# Patient Record
Sex: MALE | Race: OTHER | HISPANIC OR LATINO | URBAN - METROPOLITAN AREA
[De-identification: names, ages, dates, MRNs, and addresses within clinical notes are randomized per-mention and may not be internally consistent; named-entity substitution may affect disease eponyms.]

---

## 2018-09-01 ENCOUNTER — EMERGENCY (EMERGENCY)
Facility: HOSPITAL | Age: 83
LOS: 1 days | Discharge: HOME | End: 2018-09-01
Attending: EMERGENCY MEDICINE | Admitting: EMERGENCY MEDICINE
Payer: MEDICAID

## 2018-09-01 VITALS
OXYGEN SATURATION: 98 % | DIASTOLIC BLOOD PRESSURE: 77 MMHG | TEMPERATURE: 99 F | SYSTOLIC BLOOD PRESSURE: 132 MMHG | HEART RATE: 97 BPM | RESPIRATION RATE: 16 BRPM

## 2018-09-01 DIAGNOSIS — R41.82 ALTERED MENTAL STATUS, UNSPECIFIED: ICD-10-CM
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
## 2018-09-01 LAB
ALBUMIN SERPL ELPH-MCNC: 4.3 G/DL — SIGNIFICANT CHANGE UP (ref 3.3–5)
ALP SERPL-CCNC: 71 U/L — SIGNIFICANT CHANGE UP (ref 40–120)
ALT FLD-CCNC: 10 U/L — SIGNIFICANT CHANGE UP (ref 10–45)
ANION GAP SERPL CALC-SCNC: 12 MMOL/L — SIGNIFICANT CHANGE UP (ref 5–17)
ANION GAP SERPL CALC-SCNC: 14 MMOL/L — SIGNIFICANT CHANGE UP (ref 5–17)
APPEARANCE UR: CLEAR — SIGNIFICANT CHANGE UP
AST SERPL-CCNC: 26 U/L — SIGNIFICANT CHANGE UP (ref 10–40)
BASOPHILS NFR BLD AUTO: 0.5 % — SIGNIFICANT CHANGE UP (ref 0–2)
BILIRUB SERPL-MCNC: 0.4 MG/DL — SIGNIFICANT CHANGE UP (ref 0.2–1.2)
BILIRUB UR-MCNC: NEGATIVE — SIGNIFICANT CHANGE UP
BUN SERPL-MCNC: 20 MG/DL — SIGNIFICANT CHANGE UP (ref 7–23)
BUN SERPL-MCNC: 21 MG/DL — SIGNIFICANT CHANGE UP (ref 7–23)
CALCIUM SERPL-MCNC: 8.2 MG/DL — LOW (ref 8.4–10.5)
CALCIUM SERPL-MCNC: 9.4 MG/DL — SIGNIFICANT CHANGE UP (ref 8.4–10.5)
CHLORIDE SERPL-SCNC: 97 MMOL/L — SIGNIFICANT CHANGE UP (ref 96–108)
CHLORIDE SERPL-SCNC: 99 MMOL/L — SIGNIFICANT CHANGE UP (ref 96–108)
CK SERPL-CCNC: 761 U/L — HIGH (ref 30–200)
CO2 SERPL-SCNC: 27 MMOL/L — SIGNIFICANT CHANGE UP (ref 22–31)
CO2 SERPL-SCNC: 28 MMOL/L — SIGNIFICANT CHANGE UP (ref 22–31)
COLOR SPEC: YELLOW — SIGNIFICANT CHANGE UP
CREAT SERPL-MCNC: 1.63 MG/DL — HIGH (ref 0.5–1.3)
CREAT SERPL-MCNC: 1.81 MG/DL — HIGH (ref 0.5–1.3)
DIFF PNL FLD: ABNORMAL
EOSINOPHIL NFR BLD AUTO: 5.7 % — SIGNIFICANT CHANGE UP (ref 0–6)
ETHANOL SERPL-MCNC: <10 MG/DL — SIGNIFICANT CHANGE UP (ref 0–10)
GLUCOSE SERPL-MCNC: 180 MG/DL — HIGH (ref 70–99)
GLUCOSE SERPL-MCNC: 95 MG/DL — SIGNIFICANT CHANGE UP (ref 70–99)
GLUCOSE UR QL: NEGATIVE — SIGNIFICANT CHANGE UP
HCT VFR BLD CALC: 42 % — SIGNIFICANT CHANGE UP (ref 39–50)
HGB BLD-MCNC: 13.4 G/DL — SIGNIFICANT CHANGE UP (ref 13–17)
KETONES UR-MCNC: NEGATIVE — SIGNIFICANT CHANGE UP
LEUKOCYTE ESTERASE UR-ACNC: NEGATIVE — SIGNIFICANT CHANGE UP
LYMPHOCYTES # BLD AUTO: 16.8 % — SIGNIFICANT CHANGE UP (ref 13–44)
MCHC RBC-ENTMCNC: 27.9 PG — SIGNIFICANT CHANGE UP (ref 27–34)
MCHC RBC-ENTMCNC: 31.9 G/DL — LOW (ref 32–36)
MCV RBC AUTO: 87.5 FL — SIGNIFICANT CHANGE UP (ref 80–100)
MONOCYTES NFR BLD AUTO: 12.3 % — SIGNIFICANT CHANGE UP (ref 2–14)
NEUTROPHILS NFR BLD AUTO: 64.7 % — SIGNIFICANT CHANGE UP (ref 43–77)
NITRITE UR-MCNC: NEGATIVE — SIGNIFICANT CHANGE UP
PCP SPEC-MCNC: SIGNIFICANT CHANGE UP
PH UR: 5.5 — SIGNIFICANT CHANGE UP (ref 5–8)
PLATELET # BLD AUTO: 203 K/UL — SIGNIFICANT CHANGE UP (ref 150–400)
POTASSIUM SERPL-MCNC: 4.1 MMOL/L — SIGNIFICANT CHANGE UP (ref 3.5–5.3)
POTASSIUM SERPL-MCNC: 4.2 MMOL/L — SIGNIFICANT CHANGE UP (ref 3.5–5.3)
POTASSIUM SERPL-SCNC: 4.1 MMOL/L — SIGNIFICANT CHANGE UP (ref 3.5–5.3)
POTASSIUM SERPL-SCNC: 4.2 MMOL/L — SIGNIFICANT CHANGE UP (ref 3.5–5.3)
PROT SERPL-MCNC: 7.8 G/DL — SIGNIFICANT CHANGE UP (ref 6–8.3)
PROT UR-MCNC: ABNORMAL MG/DL
RBC # BLD: 4.8 M/UL — SIGNIFICANT CHANGE UP (ref 4.2–5.8)
RBC # FLD: 15.9 % — SIGNIFICANT CHANGE UP (ref 10.3–16.9)
SODIUM SERPL-SCNC: 138 MMOL/L — SIGNIFICANT CHANGE UP (ref 135–145)
SODIUM SERPL-SCNC: 139 MMOL/L — SIGNIFICANT CHANGE UP (ref 135–145)
SP GR SPEC: 1.02 — SIGNIFICANT CHANGE UP (ref 1–1.03)
UROBILINOGEN FLD QL: 0.2 E.U./DL — SIGNIFICANT CHANGE UP
WBC # BLD: 7.8 K/UL — SIGNIFICANT CHANGE UP (ref 3.8–10.5)
WBC # FLD AUTO: 7.8 K/UL — SIGNIFICANT CHANGE UP (ref 3.8–10.5)

## 2018-09-01 PROCEDURE — 80048 BASIC METABOLIC PNL TOTAL CA: CPT

## 2018-09-01 PROCEDURE — 70450 CT HEAD/BRAIN W/O DYE: CPT

## 2018-09-01 PROCEDURE — 84484 ASSAY OF TROPONIN QUANT: CPT

## 2018-09-01 PROCEDURE — 96360 HYDRATION IV INFUSION INIT: CPT

## 2018-09-01 PROCEDURE — 70450 CT HEAD/BRAIN W/O DYE: CPT | Mod: 26

## 2018-09-01 PROCEDURE — 99284 EMERGENCY DEPT VISIT MOD MDM: CPT

## 2018-09-01 PROCEDURE — 80053 COMPREHEN METABOLIC PANEL: CPT

## 2018-09-01 PROCEDURE — 36415 COLL VENOUS BLD VENIPUNCTURE: CPT

## 2018-09-01 PROCEDURE — 85025 COMPLETE CBC W/AUTO DIFF WBC: CPT

## 2018-09-01 PROCEDURE — 83880 ASSAY OF NATRIURETIC PEPTIDE: CPT

## 2018-09-01 PROCEDURE — 82962 GLUCOSE BLOOD TEST: CPT

## 2018-09-01 PROCEDURE — 99284 EMERGENCY DEPT VISIT MOD MDM: CPT | Mod: 25

## 2018-09-01 PROCEDURE — 82550 ASSAY OF CK (CPK): CPT

## 2018-09-01 PROCEDURE — 80307 DRUG TEST PRSMV CHEM ANLYZR: CPT

## 2018-09-01 PROCEDURE — 81001 URINALYSIS AUTO W/SCOPE: CPT

## 2018-09-01 PROCEDURE — 87086 URINE CULTURE/COLONY COUNT: CPT

## 2018-09-01 RX ORDER — SODIUM CHLORIDE 9 MG/ML
1000 INJECTION INTRAMUSCULAR; INTRAVENOUS; SUBCUTANEOUS ONCE
Qty: 0 | Refills: 0 | Status: COMPLETED | OUTPATIENT
Start: 2018-09-01 | End: 2018-09-01

## 2018-09-01 RX ORDER — SODIUM CHLORIDE 9 MG/ML
500 INJECTION INTRAMUSCULAR; INTRAVENOUS; SUBCUTANEOUS ONCE
Qty: 0 | Refills: 0 | Status: COMPLETED | OUTPATIENT
Start: 2018-09-01 | End: 2018-09-01

## 2018-09-01 RX ADMIN — SODIUM CHLORIDE 1000 MILLILITER(S): 9 INJECTION INTRAMUSCULAR; INTRAVENOUS; SUBCUTANEOUS at 20:24

## 2018-09-01 RX ADMIN — SODIUM CHLORIDE 1000 MILLILITER(S): 9 INJECTION INTRAMUSCULAR; INTRAVENOUS; SUBCUTANEOUS at 21:57

## 2018-09-01 RX ADMIN — SODIUM CHLORIDE 1000 MILLILITER(S): 9 INJECTION INTRAMUSCULAR; INTRAVENOUS; SUBCUTANEOUS at 19:28

## 2018-09-01 NOTE — ED ADULT NURSE REASSESSMENT NOTE - NS ED NURSE REASSESS COMMENT FT1
Patient a/ox 3, vital signs stable, additional labs sent, results and disposition pending.  NSS 500ml bolus additional completed.

## 2018-09-01 NOTE — ED ADULT NURSE NOTE - LANGUAGE ASSISTANCE NEEDED
Yes-Patient/Caregiver accepts free interpretation services.../I am fluent in Syriac, able to interpret for pt in triage

## 2018-09-01 NOTE — ED ADULT NURSE NOTE - NSIMPLEMENTINTERV_GEN_ALL_ED
Implemented All Fall Risk Interventions:  Blue to call system. Call bell, personal items and telephone within reach. Instruct patient to call for assistance. Room bathroom lighting operational. Non-slip footwear when patient is off stretcher. Physically safe environment: no spills, clutter or unnecessary equipment. Stretcher in lowest position, wheels locked, appropriate side rails in place. Provide visual cue, wrist band, yellow gown, etc. Monitor gait and stability. Monitor for mental status changes and reorient to person, place, and time. Review medications for side effects contributing to fall risk. Reinforce activity limits and safety measures with patient and family.

## 2018-09-01 NOTE — ED ADULT NURSE REASSESSMENT NOTE - NS ED NURSE REASSESS COMMENT FT1
Patient a/oX 3, no pain or other symptom complaint, cooperative with care, vital signs stable.  Left FA PIV #20 in place, all labs sent, no complications.  NSS bolus ongoing, tolerating well.  Fall precaution observed.

## 2018-09-01 NOTE — ED ADULT TRIAGE NOTE - CHIEF COMPLAINT QUOTE
Pt BIBA from street, as per EMS pt had pinpoint pupils and not responding verbally, given Narcan 0.2mg IM and pt became alert and oriented. Pt denies any pain, injury, complaints.

## 2018-09-01 NOTE — ED PROVIDER NOTE - PHYSICAL EXAMINATION
CON: somnolent, arousable to loud verbal or painful stimuli, HENMT: no pooling of secretion, protecting airway, no facial ecchymosis or evidence of trauma, HEAD: no obvious hematoma or laceration, CV: rrr, RESP: chest rise, breathing spontaneously, GI: soft, SKIN: bl LE w/ ace wrap and kerlex, after undressing, no obvious bleeding, noted skin discoloration c/w chronic venous insufficiency, and chronic ulcer w/o drainage, MSK: no deformity noted, NEURO: limited exam 2/2 mental status

## 2018-09-01 NOTE — ED ADULT NURSE NOTE - OBJECTIVE STATEMENT
Patient BIBA due to pinpoint pupils and passed out on street, arrives a/ox 3, s/p Narcan IM administered by EMS.  Arrives a/oX 3, disoriented to situation, states does not know what happened, states just passed out, not in any pain, SOB or distress, answers other questions appropriately, POC glucose 97 in ED, c/o of head pain/headache.  No obvious injuries, open areas or bleeding noted, no other neuro deficits.

## 2018-09-01 NOTE — ED ADULT NURSE REASSESSMENT NOTE - NS ED NURSE REASSESS COMMENT FT1
Patient sitting up in bed at th is time , a/oX 3, no c/o of any pain or SOB, no distress.  NSS bolus completed.  Left FA PIV #20 in place, all labs sent.  Food and fluids PO tolerated well.  Disposition pending.

## 2018-09-01 NOTE — ED ADULT TRIAGE NOTE - LANGUAGE ASSISTANCE NEEDED
I am fluent in Argentine, able to interpret for pt in triage/Yes-Patient/Caregiver accepts free interpretation services...

## 2018-09-01 NOTE — ED PROVIDER NOTE - OBJECTIVE STATEMENT
84 yom pw ams, found by bystanders sitting on a bench, slumped over.  also c/o headache in the back.  per EMS, pinpoint pupils, rec'd IM narcan, w/ improved MS.  pt states he takes a multitude of meds.  found morphine/ibuprofen in pocket.  hx of chronic venous insufficiency.  denies bleeding.

## 2018-09-02 VITALS
HEART RATE: 82 BPM | DIASTOLIC BLOOD PRESSURE: 71 MMHG | SYSTOLIC BLOOD PRESSURE: 108 MMHG | RESPIRATION RATE: 18 BRPM | OXYGEN SATURATION: 99 %

## 2018-09-03 LAB
CULTURE RESULTS: SIGNIFICANT CHANGE UP
SPECIMEN SOURCE: SIGNIFICANT CHANGE UP

## 2018-09-06 LAB — DRUG SCREEN, SERUM: ABNORMAL

## 2018-09-23 ENCOUNTER — INPATIENT (INPATIENT)
Facility: HOSPITAL | Age: 83
LOS: 0 days | Discharge: HOME | DRG: 191 | End: 2018-09-24
Attending: STUDENT IN AN ORGANIZED HEALTH CARE EDUCATION/TRAINING PROGRAM | Admitting: STUDENT IN AN ORGANIZED HEALTH CARE EDUCATION/TRAINING PROGRAM
Payer: MEDICAID

## 2018-09-23 VITALS
HEART RATE: 109 BPM | SYSTOLIC BLOOD PRESSURE: 116 MMHG | RESPIRATION RATE: 22 BRPM | DIASTOLIC BLOOD PRESSURE: 71 MMHG | TEMPERATURE: 99 F | OXYGEN SATURATION: 92 %

## 2018-09-23 LAB
ALBUMIN SERPL ELPH-MCNC: 4 G/DL — SIGNIFICANT CHANGE UP (ref 3.3–5)
ALP SERPL-CCNC: 64 U/L — SIGNIFICANT CHANGE UP (ref 40–120)
ALT FLD-CCNC: <5 U/L — LOW (ref 10–45)
ANION GAP SERPL CALC-SCNC: 12 MMOL/L — SIGNIFICANT CHANGE UP (ref 5–17)
AST SERPL-CCNC: 10 U/L — SIGNIFICANT CHANGE UP (ref 10–40)
BASOPHILS NFR BLD AUTO: 0.3 % — SIGNIFICANT CHANGE UP (ref 0–2)
BILIRUB SERPL-MCNC: 0.2 MG/DL — SIGNIFICANT CHANGE UP (ref 0.2–1.2)
BUN SERPL-MCNC: 11 MG/DL — SIGNIFICANT CHANGE UP (ref 7–23)
CALCIUM SERPL-MCNC: 9 MG/DL — SIGNIFICANT CHANGE UP (ref 8.4–10.5)
CHLORIDE SERPL-SCNC: 97 MMOL/L — SIGNIFICANT CHANGE UP (ref 96–108)
CO2 SERPL-SCNC: 31 MMOL/L — SIGNIFICANT CHANGE UP (ref 22–31)
CREAT SERPL-MCNC: 1.19 MG/DL — SIGNIFICANT CHANGE UP (ref 0.5–1.3)
EOSINOPHIL NFR BLD AUTO: 4.9 % — SIGNIFICANT CHANGE UP (ref 0–6)
GLUCOSE SERPL-MCNC: 94 MG/DL — SIGNIFICANT CHANGE UP (ref 70–99)
HCT VFR BLD CALC: 42 % — SIGNIFICANT CHANGE UP (ref 39–50)
HGB BLD-MCNC: 13.4 G/DL — SIGNIFICANT CHANGE UP (ref 13–17)
LYMPHOCYTES # BLD AUTO: 17.6 % — SIGNIFICANT CHANGE UP (ref 13–44)
MCHC RBC-ENTMCNC: 28.2 PG — SIGNIFICANT CHANGE UP (ref 27–34)
MCHC RBC-ENTMCNC: 31.9 G/DL — LOW (ref 32–36)
MCV RBC AUTO: 88.4 FL — SIGNIFICANT CHANGE UP (ref 80–100)
MONOCYTES NFR BLD AUTO: 7.6 % — SIGNIFICANT CHANGE UP (ref 2–14)
NEUTROPHILS NFR BLD AUTO: 69.6 % — SIGNIFICANT CHANGE UP (ref 43–77)
PLATELET # BLD AUTO: 320 K/UL — SIGNIFICANT CHANGE UP (ref 150–400)
POTASSIUM SERPL-MCNC: 4.2 MMOL/L — SIGNIFICANT CHANGE UP (ref 3.5–5.3)
POTASSIUM SERPL-SCNC: 4.2 MMOL/L — SIGNIFICANT CHANGE UP (ref 3.5–5.3)
PROT SERPL-MCNC: 7.8 G/DL — SIGNIFICANT CHANGE UP (ref 6–8.3)
RBC # BLD: 4.75 M/UL — SIGNIFICANT CHANGE UP (ref 4.2–5.8)
RBC # FLD: 15.1 % — SIGNIFICANT CHANGE UP (ref 10.3–16.9)
SODIUM SERPL-SCNC: 140 MMOL/L — SIGNIFICANT CHANGE UP (ref 135–145)
WBC # BLD: 9.2 K/UL — SIGNIFICANT CHANGE UP (ref 3.8–10.5)
WBC # FLD AUTO: 9.2 K/UL — SIGNIFICANT CHANGE UP (ref 3.8–10.5)

## 2018-09-23 PROCEDURE — 99223 1ST HOSP IP/OBS HIGH 75: CPT | Mod: GC

## 2018-09-23 PROCEDURE — 71250 CT THORAX DX C-: CPT | Mod: 26

## 2018-09-23 PROCEDURE — 99285 EMERGENCY DEPT VISIT HI MDM: CPT | Mod: 25

## 2018-09-23 PROCEDURE — 71045 X-RAY EXAM CHEST 1 VIEW: CPT | Mod: 26

## 2018-09-23 PROCEDURE — 70450 CT HEAD/BRAIN W/O DYE: CPT | Mod: 26

## 2018-09-23 RX ORDER — IPRATROPIUM/ALBUTEROL SULFATE 18-103MCG
3 AEROSOL WITH ADAPTER (GRAM) INHALATION ONCE
Qty: 0 | Refills: 0 | Status: COMPLETED | OUTPATIENT
Start: 2018-09-23 | End: 2018-09-23

## 2018-09-23 RX ORDER — AZITHROMYCIN 500 MG/1
400 TABLET, FILM COATED ORAL ONCE
Qty: 0 | Refills: 0 | Status: DISCONTINUED | OUTPATIENT
Start: 2018-09-23 | End: 2018-09-23

## 2018-09-23 RX ORDER — CEFTRIAXONE 500 MG/1
1 INJECTION, POWDER, FOR SOLUTION INTRAMUSCULAR; INTRAVENOUS ONCE
Qty: 0 | Refills: 0 | Status: COMPLETED | OUTPATIENT
Start: 2018-09-23 | End: 2018-09-23

## 2018-09-23 RX ORDER — SODIUM CHLORIDE 9 MG/ML
1000 INJECTION INTRAMUSCULAR; INTRAVENOUS; SUBCUTANEOUS ONCE
Qty: 0 | Refills: 0 | Status: COMPLETED | OUTPATIENT
Start: 2018-09-23 | End: 2018-09-23

## 2018-09-23 RX ORDER — AZITHROMYCIN 500 MG/1
500 TABLET, FILM COATED ORAL ONCE
Qty: 0 | Refills: 0 | Status: COMPLETED | OUTPATIENT
Start: 2018-09-23 | End: 2018-09-23

## 2018-09-23 RX ADMIN — Medication 3 MILLILITER(S): at 19:55

## 2018-09-23 RX ADMIN — Medication 3 MILLILITER(S): at 23:27

## 2018-09-23 RX ADMIN — CEFTRIAXONE 100 GRAM(S): 500 INJECTION, POWDER, FOR SOLUTION INTRAMUSCULAR; INTRAVENOUS at 22:05

## 2018-09-23 RX ADMIN — AZITHROMYCIN 255 MILLIGRAM(S): 500 TABLET, FILM COATED ORAL at 23:02

## 2018-09-23 RX ADMIN — SODIUM CHLORIDE 1000 MILLILITER(S): 9 INJECTION INTRAMUSCULAR; INTRAVENOUS; SUBCUTANEOUS at 21:00

## 2018-09-23 RX ADMIN — Medication 3 MILLILITER(S): at 19:59

## 2018-09-23 RX ADMIN — SODIUM CHLORIDE 2000 MILLILITER(S): 9 INJECTION INTRAMUSCULAR; INTRAVENOUS; SUBCUTANEOUS at 19:59

## 2018-09-23 RX ADMIN — CEFTRIAXONE 1 GRAM(S): 500 INJECTION, POWDER, FOR SOLUTION INTRAMUSCULAR; INTRAVENOUS at 22:35

## 2018-09-23 NOTE — H&P ADULT - PROBLEM SELECTOR PLAN 3
Patient with history of diabetes on metformin, ISS while inpatient  A1C in the AM Patient presenting with hypoxia and tachycardia w/ known active lung malignancy  Given high risk for PE will do CT PE to r/o embolism  c/w lovenox for DVT prophylaxis, pending results will escalate to therapeutic dose

## 2018-09-23 NOTE — ED ADULT TRIAGE NOTE - CHIEF COMPLAINT QUOTE
EMS states "he was with his friends and they called 911 because they thought that he was weak and a little confused."

## 2018-09-23 NOTE — ED PROVIDER NOTE - PHYSICAL EXAMINATION
CONSTITUTIONAL: elderly male, lethargic, NAD  SKIN: Senile skin turgor, normal color.  Lips dry. No rash.    HEAD: NC/AT.  EYES: Conjunctiva clear. EOMI. PERRL.    ENT: Airway patent, OP without erythema, tonsillar swelling or exudate; uvula midline without swelling. Nasal mucosa clear, no rhinorrhea.   RESPIRATORY:  Breathing non-labored. No retractions or accessory muscle use.  Lungs with exp crackles and wheezes bilaterally. O2 sat 92% room air, 100% on supplemental oxygen.  CARDIOVASCULAR:  RRR, S1S2. No M/R/G.      GI:  Abdomen soft, nontender.    MSK: Neck supple with painless ROM.  Chronic venous stasis changes bilaterally, no LE edema.  NEURO: lethargic, but able to follow commands.  CN grossly intact. KAPOOR 5/5 strength, no arm drift.  F-N intact.

## 2018-09-23 NOTE — H&P ADULT - ASSESSMENT
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

## 2018-09-23 NOTE — H&P ADULT - PROBLEM SELECTOR PLAN 4
Patient with active lung CA not pursuing treatment get collateral from PCP in the AM Patient with history of diabetes on metformin, ISS while inpatient  A1C in the AM monitor for withdrawl sxs, supportive care for sxs

## 2018-09-23 NOTE — H&P ADULT - NSHPLABSRESULTS_GEN_ALL_CORE
LABS:                         13.4   9.2   )-----------( 320      ( 23 Sep 2018 19:48 )             42.0     09-23    140  |  97  |  11  ----------------------------<  94  4.2   |  31  |  1.19    Ca    9.0      23 Sep 2018 19:48    TPro  7.8  /  Alb  4.0  /  TBili  0.2  /  DBili  x   /  AST  10  /  ALT  <5<L>  /  AlkPhos  64  09-23                  RADIOLOGY, EKG & ADDITIONAL TESTS: Reviewed.

## 2018-09-23 NOTE — ED ADULT NURSE NOTE - NSIMPLEMENTINTERV_GEN_ALL_ED
Implemented All Fall with Harm Risk Interventions:  [place] to call system. Call bell, personal items and telephone within reach. Instruct patient to call for assistance. Room bathroom lighting operational. Non-slip footwear when patient is off stretcher. Physically safe environment: no spills, clutter or unnecessary equipment. Stretcher in lowest position, wheels locked, appropriate side rails in place. Provide visual cue, wrist band, yellow gown, etc. Monitor gait and stability. Monitor for mental status changes and reorient to person, place, and time. Review medications for side effects contributing to fall risk. Reinforce activity limits and safety measures with patient and family. Provide visual clues: red socks.

## 2018-09-23 NOTE — H&P ADULT - ATTENDING COMMENTS
patient seen and examined    reviewed vs, labs, available radiological reports/ studies, ekg     agree w/ PE findings as above w/ additions/ exceptions/ pertinent findings:     1.  2.   3.       rest of plan as above patient seen and examined    reviewed vs, labs, available radiological reports/ studies, ekg     agree w/ PE findings as above w/ additions/ exceptions/ pertinent findings: asleep, awoke, appears tired, NAD, tongue is dry, +JVD, s1s2, lungs mostly clear w/ fine crackles at bases b/l, nt/nd, +venous stasis at lower ext, wrapped in ace bandages b/l     1. COPD exacerbation/ rhinovirus: duonebs, prednisone PO x 4 days, on supplemental o2 ; c/w azithromycin for COPD exacerbation; check BNP  2. follow up CT angio chest  3. monitor and treat heroin wthdrawl sxs      rest of plan as above

## 2018-09-23 NOTE — H&P ADULT - PROBLEM SELECTOR PLAN 7
diabetic diet  K>4 Mg>2 lovenox in the setting of active lung CA Patient with chronic venous insufficiency with ACE wraps  Consider Wound care Consult in the AM

## 2018-09-23 NOTE — H&P ADULT - NSHPREVIEWOFSYSTEMS_GEN_ALL_CORE
REVIEW OF SYSTEMS:  CONSTITUTIONAL: Patient denies weakness, fevers or chills  EYES/ENT: Patient denies visual changes;  denies vertigo or throat pain   NECK: Patient denies pain or stiffness  RESPIRATORY: + cough white sputum, SOB with exertion at baseline 2 blocks  CARDIOVASCULAR: Patient denies chest pain or palpitations  GASTROINTESTINAL: Patient denies abdominal or epigastric pain, nausea, vomiting, or hematemesis, diarrhea or constipation, melena or hematochezia.  GENITOURINARY: Patient denies dysuria, frequency or hematuria  NEUROLOGICAL: Patient denies numbness or weakness  SKIN: Patient denies itching, burning, rashes, or lesions   All other review of systems is negative unless indicated above.

## 2018-09-23 NOTE — ED PROVIDER NOTE - PROGRESS NOTE DETAILS
More alert, admits to snorting heroin.  Also says he has had a new cough for the past week without sputum.  No fever/chills.  He says his doctor is ? [place]air. Coughing a lot, persistent wheezing and rhonchi.  Mild tachycardia, room air sats 86%.  Likely bronchitis with underlying COPD and lung cancer.  Will check RVP, continue nebs, and admit.

## 2018-09-23 NOTE — ED PROVIDER NOTE - MEDICAL DECISION MAKING DETAILS
AMS improved with time likely due to heroin use, to which pt admits.  Has cough and probably infiltrates on CXR, will get noncontrast CT scan for better evaluation.  Blood cx, nebs, and abx initiated.  Hypoxic to low 90s on room air, will plan to admit.

## 2018-09-23 NOTE — H&P ADULT - PROBLEM SELECTOR PLAN 9
1) PCP Contacted on Admission: (Y/N) --> Name & Phone #: Call Dr. Goodwin in the AM  2) Date of Contact with PCP:  3) PCP Contacted at Discharge: (Y/N)  4) Summary of Handoff Given to PCP:   5) Post-Discharge Appointment Date and Location: diabetic diet  K>4 Mg>2

## 2018-09-23 NOTE — H&P ADULT - NSHPPHYSICALEXAM_GEN_ALL_CORE
VITAL SIGNS:  T(F): 97.8 (09-24-18 @ 01:09), Max: 98.9 (09-23-18 @ 23:27)  HR: 104 (09-24-18 @ 01:09) (94 - 109)  BP: 105/67 (09-24-18 @ 01:09) (105/67 - 127/74)  BP(mean): --  RR: 20 (09-24-18 @ 01:09) (18 - 22)  SpO2: 96% (09-24-18 @ 01:09) (86% - 98%)    PHYSICAL EXAM:  Constitutional: WDWN sitting comfortably in bed  HEENT: NC/AT, PERRL, EOMI, anicteric sclera, no nasal discharge; uvula midline, no oropharyngeal erythema or exudates; MMM  Neck: supple; no JVD or thyromegaly  Respiratory: Diffuse wheezes w/ prolong expiratory phase  Cardiac: +S1/S2; tachycardic; no M/R/G; PMI non-displaced  Gastrointestinal: soft, NT/ND; no rebound or guarding; +BSx4  Extremities: WWP, no clubbing or cyanosis; 2+ edema bilaterally with venostasis changes  Vascular: 2+ radial, femoral, DP/PT pulses B/L  Dermatologic: skin warm, dry and intact; venostasis changes lower extremities  Lymphatic: no submandibular or cervical LAD  Neurologic: AAOx3; CNII-XII grossly intact; no focal deficits  Psychiatric: affect and characteristics of appearance, verbalizations, behaviors are appropriate, denies SI/HI/AH/VH

## 2018-09-23 NOTE — H&P ADULT - PROBLEM SELECTOR PLAN 10
1) PCP Contacted on Admission: (Y/N) --> Name & Phone #: Call Dr. Goodwin in the AM  2) Date of Contact with PCP:  3) PCP Contacted at Discharge: (Y/N)  4) Summary of Handoff Given to PCP:   5) Post-Discharge Appointment Date and Location:

## 2018-09-23 NOTE — H&P ADULT - FAMILY HISTORY
No pertinent family history in first degree relatives No pertinent family history in first degree relatives, PATIENT REFUSING TO DISCUSSS

## 2018-09-23 NOTE — H&P ADULT - PROBLEM SELECTOR PLAN 1
Patient presenting initially lethargic under the influence of heroin now alert and oriented x 3, not requiring narcan found to be diffusely wheezy with increased oxygen requirements from his baseline 3L, currently using 4L requiring frequent nebs, RVP + rhinovirus  - start prednisone 40mg daily for 5 days  - symptomatic treatment for rhinovirus  - kael prn  - c/w oxygen maintain sats above 88 percent

## 2018-09-23 NOTE — H&P ADULT - PROBLEM SELECTOR PLAN 8
1) PCP Contacted on Admission: (Y/N) --> Name & Phone #: Call Dr. Goodwin in the AM  2) Date of Contact with PCP:  3) PCP Contacted at Discharge: (Y/N)  4) Summary of Handoff Given to PCP:   5) Post-Discharge Appointment Date and Location: diabetic diet  K>4 Mg>2 lovenox in the setting of active lung CA

## 2018-09-23 NOTE — H&P ADULT - HISTORY OF PRESENT ILLNESS
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 Was found altered by friend who called EMS.  Patient states he uses heroin occasionally  Used heroin today. Patient awake and alert by time of this examiners examination. However upon ED evaluation patient noted to be lethargic and falling asleep during evaluation.  Patient now endorses for the past 2-3 weeks a new cough w/o fever or chills. Denies any SOB worsening from his baseline, able to walk 2 blocks before becomming SOB. IS SOB at baseline uses 3L of oxygen at home. States [facility] a senior center with a home attendant.   PAST MEDICAL & SURGICAL HISTORY:  Diabetes  Chronic venous insufficiency  Lung Resection for lung CA  FAMILY HISTORY:  Unwilling to disclose, states parents are   SHX:  active smoker a pack a day for many years, former heavy alcohol use (usually vodka) quit 40 years ago, active heroin user   ED Course:  T 98.8, , /71, R 22, S 92%.  Patient given ceftriaxone + azithromycin.  Patient additionally given nebulizers x 3 for diffuse wheezing.  Patient had CXR and CT chest indicative of lung CA and emphysematous changes. RVP with enterovirus. No leukocytosis labs wnl. Admitted due to persistent oxygen requirements. Currently satting in the high 90's on 4L NC.

## 2018-09-23 NOTE — H&P ADULT - PROBLEM SELECTOR PROBLEM 8
Transition of care performed with sharing of clinical summary Nutrition, metabolism, and development symptoms Prophylactic measure

## 2018-09-23 NOTE — ED PROVIDER NOTE - OBJECTIVE STATEMENT
83 yo m BIBA after pt's friends noted AMS/lethargy.  He apparently uses heroin (snorts) every day.  On arrival, he was arousable to voice, oriented to self and place, but did not know the day of the week.  He kept falling asleep during the initial evaluatiion and could not effectively provide a history.  RN report hx of lung cancer, lives in shelter.  He was seen here a few weeks ago after presenting with AMS, morphine was found in his pocket and he responded well to narcan.

## 2018-09-23 NOTE — ED PROVIDER NOTE - ATTENDING CONTRIBUTION TO CARE
I have seen and examined the pt, reviewed all pertinent clinical data, and I agree with the documencation/care/plan executed by JEANE Caldwell.

## 2018-09-23 NOTE — H&P ADULT - PROBLEM SELECTOR PLAN 5
Patient with chronic venous insufficiency with ACE wraps  Wound care Consult in the AM Patient with active lung CA not pursuing treatment get collateral from PCP in the AM

## 2018-09-23 NOTE — H&P ADULT - PROBLEM SELECTOR PLAN 6
lovenox in the setting of active lung CA Patient with chronic venous insufficiency with ACE wraps  Wound care Consult in the AM Patient with chronic venous insufficiency with ACE wraps  Consider Wound care Consult in the AM Patient with history of diabetes on metformin, ISS while inpatient  A1C in the AM

## 2018-09-24 VITALS
SYSTOLIC BLOOD PRESSURE: 111 MMHG | RESPIRATION RATE: 18 BRPM | DIASTOLIC BLOOD PRESSURE: 63 MMHG | HEART RATE: 95 BPM | TEMPERATURE: 98 F | OXYGEN SATURATION: 100 %

## 2018-09-24 DIAGNOSIS — R91.1 SOLITARY PULMONARY NODULE: ICD-10-CM

## 2018-09-24 DIAGNOSIS — E11.9 TYPE 2 DIABETES MELLITUS WITHOUT COMPLICATIONS: ICD-10-CM

## 2018-09-24 DIAGNOSIS — R63.8 OTHER SYMPTOMS AND SIGNS CONCERNING FOOD AND FLUID INTAKE: ICD-10-CM

## 2018-09-24 DIAGNOSIS — J44.1 CHRONIC OBSTRUCTIVE PULMONARY DISEASE WITH (ACUTE) EXACERBATION: ICD-10-CM

## 2018-09-24 DIAGNOSIS — I87.2 VENOUS INSUFFICIENCY (CHRONIC) (PERIPHERAL): ICD-10-CM

## 2018-09-24 DIAGNOSIS — Z29.9 ENCOUNTER FOR PROPHYLACTIC MEASURES, UNSPECIFIED: ICD-10-CM

## 2018-09-24 DIAGNOSIS — Z90.2 ACQUIRED ABSENCE OF LUNG [PART OF]: Chronic | ICD-10-CM

## 2018-09-24 DIAGNOSIS — I26.99 OTHER PULMONARY EMBOLISM WITHOUT ACUTE COR PULMONALE: ICD-10-CM

## 2018-09-24 DIAGNOSIS — B34.8 OTHER VIRAL INFECTIONS OF UNSPECIFIED SITE: ICD-10-CM

## 2018-09-24 DIAGNOSIS — F11.10 OPIOID ABUSE, UNCOMPLICATED: ICD-10-CM

## 2018-09-24 DIAGNOSIS — Z91.89 OTHER SPECIFIED PERSONAL RISK FACTORS, NOT ELSEWHERE CLASSIFIED: ICD-10-CM

## 2018-09-24 DIAGNOSIS — C34.90 MALIGNANT NEOPLASM OF UNSPECIFIED PART OF UNSPECIFIED BRONCHUS OR LUNG: ICD-10-CM

## 2018-09-24 PROBLEM — I10 ESSENTIAL (PRIMARY) HYPERTENSION: Chronic | Status: INACTIVE | Noted: 2018-09-01 | Resolved: 2018-09-24

## 2018-09-24 PROBLEM — B19.20 UNSPECIFIED VIRAL HEPATITIS C WITHOUT HEPATIC COMA: Chronic | Status: INACTIVE | Noted: 2018-09-01 | Resolved: 2018-09-24

## 2018-09-24 LAB
ANION GAP SERPL CALC-SCNC: 11 MMOL/L — SIGNIFICANT CHANGE UP (ref 5–17)
BASOPHILS NFR BLD AUTO: 0.2 % — SIGNIFICANT CHANGE UP (ref 0–2)
BUN SERPL-MCNC: 13 MG/DL — SIGNIFICANT CHANGE UP (ref 7–23)
CALCIUM SERPL-MCNC: 8.7 MG/DL — SIGNIFICANT CHANGE UP (ref 8.4–10.5)
CHLORIDE SERPL-SCNC: 99 MMOL/L — SIGNIFICANT CHANGE UP (ref 96–108)
CO2 SERPL-SCNC: 29 MMOL/L — SIGNIFICANT CHANGE UP (ref 22–31)
CREAT SERPL-MCNC: 1.01 MG/DL — SIGNIFICANT CHANGE UP (ref 0.5–1.3)
EOSINOPHIL NFR BLD AUTO: 1.3 % — SIGNIFICANT CHANGE UP (ref 0–6)
GLUCOSE BLDC GLUCOMTR-MCNC: 134 MG/DL — HIGH (ref 70–99)
GLUCOSE BLDC GLUCOMTR-MCNC: 155 MG/DL — HIGH (ref 70–99)
GLUCOSE BLDC GLUCOMTR-MCNC: 191 MG/DL — HIGH (ref 70–99)
GLUCOSE SERPL-MCNC: 126 MG/DL — HIGH (ref 70–99)
HBA1C BLD-MCNC: 6.1 % — HIGH (ref 4–5.6)
HCT VFR BLD CALC: 39.8 % — SIGNIFICANT CHANGE UP (ref 39–50)
HGB BLD-MCNC: 12.4 G/DL — LOW (ref 13–17)
LYMPHOCYTES # BLD AUTO: 7.2 % — LOW (ref 13–44)
MAGNESIUM SERPL-MCNC: 2.1 MG/DL — SIGNIFICANT CHANGE UP (ref 1.6–2.6)
MCHC RBC-ENTMCNC: 27.7 PG — SIGNIFICANT CHANGE UP (ref 27–34)
MCHC RBC-ENTMCNC: 31.2 G/DL — LOW (ref 32–36)
MCV RBC AUTO: 89 FL — SIGNIFICANT CHANGE UP (ref 80–100)
MONOCYTES NFR BLD AUTO: 5.2 % — SIGNIFICANT CHANGE UP (ref 2–14)
NEUTROPHILS NFR BLD AUTO: 86.1 % — HIGH (ref 43–77)
NT-PROBNP SERPL-SCNC: 254 PG/ML — SIGNIFICANT CHANGE UP (ref 0–300)
PHOSPHATE SERPL-MCNC: 3.2 MG/DL — SIGNIFICANT CHANGE UP (ref 2.5–4.5)
PLATELET # BLD AUTO: 255 K/UL — SIGNIFICANT CHANGE UP (ref 150–400)
POTASSIUM SERPL-MCNC: 4 MMOL/L — SIGNIFICANT CHANGE UP (ref 3.5–5.3)
POTASSIUM SERPL-SCNC: 4 MMOL/L — SIGNIFICANT CHANGE UP (ref 3.5–5.3)
RAPID RVP RESULT: DETECTED
RBC # BLD: 4.47 M/UL — SIGNIFICANT CHANGE UP (ref 4.2–5.8)
RBC # FLD: 15.3 % — SIGNIFICANT CHANGE UP (ref 10.3–16.9)
RV+EV RNA SPEC QL NAA+PROBE: DETECTED
SODIUM SERPL-SCNC: 139 MMOL/L — SIGNIFICANT CHANGE UP (ref 135–145)
WBC # BLD: 9.9 K/UL — SIGNIFICANT CHANGE UP (ref 3.8–10.5)
WBC # FLD AUTO: 9.9 K/UL — SIGNIFICANT CHANGE UP (ref 3.8–10.5)

## 2018-09-24 PROCEDURE — 71275 CT ANGIOGRAPHY CHEST: CPT | Mod: 26

## 2018-09-24 PROCEDURE — 99238 HOSP IP/OBS DSCHRG MGMT 30/<: CPT

## 2018-09-24 PROCEDURE — 99222 1ST HOSP IP/OBS MODERATE 55: CPT

## 2018-09-24 RX ORDER — INSULIN LISPRO 100/ML
VIAL (ML) SUBCUTANEOUS
Qty: 0 | Refills: 0 | Status: DISCONTINUED | OUTPATIENT
Start: 2018-09-24 | End: 2018-09-24

## 2018-09-24 RX ORDER — GLUCAGON INJECTION, SOLUTION 0.5 MG/.1ML
1 INJECTION, SOLUTION SUBCUTANEOUS ONCE
Qty: 0 | Refills: 0 | Status: DISCONTINUED | OUTPATIENT
Start: 2018-09-24 | End: 2018-09-24

## 2018-09-24 RX ORDER — DEXTROSE 50 % IN WATER 50 %
25 SYRINGE (ML) INTRAVENOUS ONCE
Qty: 0 | Refills: 0 | Status: DISCONTINUED | OUTPATIENT
Start: 2018-09-24 | End: 2018-09-24

## 2018-09-24 RX ORDER — IPRATROPIUM/ALBUTEROL SULFATE 18-103MCG
3 AEROSOL WITH ADAPTER (GRAM) INHALATION EVERY 6 HOURS
Qty: 0 | Refills: 0 | Status: DISCONTINUED | OUTPATIENT
Start: 2018-09-24 | End: 2018-09-24

## 2018-09-24 RX ORDER — TIOTROPIUM BROMIDE AND OLODATEROL 3.124; 2.736 UG/1; UG/1
2 SPRAY, METERED RESPIRATORY (INHALATION)
Qty: 1 | Refills: 0 | OUTPATIENT
Start: 2018-09-24

## 2018-09-24 RX ORDER — BUPRENORPHINE AND NALOXONE 2; .5 MG/1; MG/1
0 TABLET SUBLINGUAL
Qty: 0 | Refills: 0 | COMMUNITY

## 2018-09-24 RX ORDER — CITALOPRAM 10 MG/1
1 TABLET, FILM COATED ORAL
Qty: 0 | Refills: 0 | COMMUNITY

## 2018-09-24 RX ORDER — ALBUTEROL 90 UG/1
0 AEROSOL, METERED ORAL
Qty: 0 | Refills: 0 | COMMUNITY

## 2018-09-24 RX ORDER — AZITHROMYCIN 500 MG/1
250 TABLET, FILM COATED ORAL DAILY
Qty: 0 | Refills: 0 | Status: DISCONTINUED | OUTPATIENT
Start: 2018-09-24 | End: 2018-09-24

## 2018-09-24 RX ORDER — ENOXAPARIN SODIUM 100 MG/ML
40 INJECTION SUBCUTANEOUS EVERY 24 HOURS
Qty: 0 | Refills: 0 | Status: DISCONTINUED | OUTPATIENT
Start: 2018-09-24 | End: 2018-09-24

## 2018-09-24 RX ORDER — DEXTROSE 50 % IN WATER 50 %
15 SYRINGE (ML) INTRAVENOUS ONCE
Qty: 0 | Refills: 0 | Status: DISCONTINUED | OUTPATIENT
Start: 2018-09-24 | End: 2018-09-24

## 2018-09-24 RX ORDER — VERAPAMIL HCL 240 MG
1 CAPSULE, EXTENDED RELEASE PELLETS 24 HR ORAL
Qty: 0 | Refills: 0 | COMMUNITY

## 2018-09-24 RX ORDER — DEXTROSE 50 % IN WATER 50 %
12.5 SYRINGE (ML) INTRAVENOUS ONCE
Qty: 0 | Refills: 0 | Status: DISCONTINUED | OUTPATIENT
Start: 2018-09-24 | End: 2018-09-24

## 2018-09-24 RX ORDER — METOPROLOL TARTRATE 50 MG
1 TABLET ORAL
Qty: 0 | Refills: 0 | COMMUNITY

## 2018-09-24 RX ORDER — SODIUM CHLORIDE 9 MG/ML
1000 INJECTION, SOLUTION INTRAVENOUS
Qty: 0 | Refills: 0 | Status: DISCONTINUED | OUTPATIENT
Start: 2018-09-24 | End: 2018-09-24

## 2018-09-24 RX ADMIN — ENOXAPARIN SODIUM 40 MILLIGRAM(S): 100 INJECTION SUBCUTANEOUS at 08:54

## 2018-09-24 RX ADMIN — Medication 3 MILLILITER(S): at 02:25

## 2018-09-24 RX ADMIN — AZITHROMYCIN 500 MILLIGRAM(S): 500 TABLET, FILM COATED ORAL at 00:02

## 2018-09-24 RX ADMIN — Medication 1: at 12:33

## 2018-09-24 RX ADMIN — Medication 40 MILLIGRAM(S): at 02:49

## 2018-09-24 RX ADMIN — Medication 1: at 17:05

## 2018-09-24 NOTE — DISCHARGE NOTE ADULT - PROVIDER TOKENS
FREE:[LAST:[stella],FIRST:[[place]],PHONE:[phone]],FAX:[(   [phone]    -],ADDRESS:[[address] [place] [address]]]

## 2018-09-24 NOTE — DISCHARGE NOTE ADULT - OTHER SIGNIFICANT FINDINGS
EXAM:  CT ANGIO CHEST PE PROTOCOL IC                          PROCEDURE DATE:  09/24/2018          INTERPRETATION:      CTA (CT angiography) of the CHEST dated 9/24/2018 10:33 AM    INDICATION: One day of moderate shortness of breath, hypoxia and   tachycardia. Evaluate for pulmonary embolism.    TECHNIQUE: CT angiography of the chest was performed during bolus   injection of intravenous contrast. Post-processing including the   production of axial, coronal and sagittal multiplanar reformatted images   and axial and coronal maximum intensity projections (MIPs) was performed.   170 cc of Omnipaque 350 was administered. 0 cc were discarded.    COMPARISON: CT chest 9/23/2018.    FINDINGS:  Suboptimal opacification of the pulmonary arteries limits assessment for   distal pulmonary emboli. Within that limitation, no main, lobar or   proximal segmental pulmonary emboli are seen. There is unchanged   dilatation of the main pulmonary trunk measuring up to 3.3 cm in   diameter. There is no change in mild ectasia of the thoracic aorta   measuring up to 4.0 cm at the sinuses of Valsalva and 3.2 cm at the   proximal descending aorta. Mediastinal surgical clips are noted. There   are increased number of nonenlarged mediastinal and right hilar lymph   nodes the largest involving the right hilum measuring 1 cm in short axis.    Patient is status post right upper lobectomy and right lower lobe wedge   resection. There is severe predominantly centrilobular emphysema with   scattered areas of paraseptal emphysema, most advanced in the upper lung   zones. There are several thin-walled air cysts at the right lung base,   the largest measuring 3.7 cm in diameter. Redemonstrated are multiple   spiculated parenchymal lesions as follows: A 2.0 cm (average of AP and   transverse dimensions) spiculated mass in the anterior segment of the   left upper lobe (series 24, image 25), a 2.4 cm spiculated mass in the   apicoposterior segment of the left upper lobe (series 24, image 17), and   a 1.9 cm subpleural nodule which may be pleural-based abutting the medial   aspect of the medial basal segment of the right lower lobe (series 24,   image 60). Additionally, there is a homogeneous oval lesion abutting the   right apical pleura and predominantly centered posterior to the right   subclavian artery. It projects within the mediastinal fat planes and   measures 2.3 x 2.0 x 1.7 cm (sagittal x AP x transverse) and is unchanged   consistent with an enlarged lymph node. Bibasilar dependent and linear   atelectasis is again present.    Limited evaluation of the upper abdomen demonstrates a simple left upper   pole renal cortical cyst measuring 1.6 cm.     Evaluation of the osseous structures demonstrates moderate degenerative   changes.      IMPRESSION:  1.  Suboptimal pulmonary arterial contrast bolus. Within that limitation,   no evidence of main, lobar or proximal segmental branch pulmonary artery   embolism is identified.  2.  Bilateral spiculated and probable pleural based masses detailed   above, unchanged, again suspicious for neoplasia. Whole-body PET/CT   correlation and histological sampling is suggested.   3.  Severe emphysema and multiple discrete parenchymal cysts in the right   lower lung zone unchanged. No pneumothorax.  4.  Unchanged dilatation of the main pulmonary trunk measuring 3.3 cm in   diameter. Findings may suggest pulmonary arterial hypertension.

## 2018-09-24 NOTE — CONSULT NOTE ADULT - PROBLEM SELECTOR RECOMMENDATION 2
Patient found to have a left sided lung nodule suspicious for malignancy. Patient reports that his doctors at [facility] had found a nodule following his lung cancer resection in 2002.  -provide patient with CD imaging of chest CT  -make an appointment to have patient follow up with his PCP for further workup and comparison to prior imaging

## 2018-09-24 NOTE — DISCHARGE NOTE ADULT - MEDICATION SUMMARY - MEDICATIONS TO TAKE
I will START or STAY ON the medications listed below when I get home from the hospital:    predniSONE 20 mg oral tablet  -- 2 tab(s) by mouth every 24 hours  -- Indication: For COPD exacerbation    metFORMIN 850 mg oral tablet  -- Indication: For Diabetes    ProAir HFA 90 mcg/inh inhalation aerosol  -- Indication: For COPD exacerbation    Stiolto Respimat 60 ACT 2.5 mcg-2.5 mcg/inh inhalation aerosol  -- 2 puff(s) inhaled once a day   -- Check with your doctor before becoming pregnant.  For inhalation only.  May cause drowsiness or dizziness.  Obtain medical advice before taking any non-prescription drugs as some may affect the action of this medication.  This drug may impair the ability to drive or operate machinery.  Use care until you become familiar with its effects.    -- Indication: For COPD exacerbation

## 2018-09-24 NOTE — DISCHARGE NOTE ADULT - PLAN OF CARE
To help your breathing You came in with problems breathing and increased need for oxygen and nebulizers. You had a test that showed you have a virus, which probably triggered your COPD exacerbation. You were treated with antibiotics and prednisone. Your breathing improved. Please continue your home inhaler, along with prednisone and the antibiotic. Please continue your metformin. Your HA1c was measured at 6.1. You have a history of lung cancer, and CT of your chest showed you still have lung cancer. Please follow up with your primary care doctor for follow up. You came in after you used heroine. You have used suboxone in the past. Please try to abstain from using heroine and follow up with your primary care physician for further treatment. Also please consider attending meetings at [facility]. You have a history of venous stasis changes in your legs, for which you have bandages. Please continue to keep them as clean as possible. You came in with problems breathing and increased need for oxygen and nebulizers. You had a test that showed you have a virus, which probably triggered your COPD exacerbation. You were treated with antibiotics and prednisone. Your breathing improved. Please continue your home inhaler, along with prednisone and the antibiotic. You will also be started on a new inhaler called stiolto. Please take as advised. If you do not have a pulmonologist, please follow up within 1 week with [facility] pulmonology. Improve shortness of breath and cough. You came in with problems breathing and increased need for oxygen and nebulizers. You had a test that showed you have a virus, which probably triggered your COPD exacerbation. You were treated with antibiotics and prednisone. Your breathing improved. Please continue your home inhaler, along with a 5 day course of prednisone. You will also be started on a new inhaler called stiolto. Please take as advised. If you do not have a pulmonologist, please follow [facility]. Please continue your metformin. Your HA1c was measured at 6.1. Please follow up with your primary doctor, Dr. Parisi. You came in with problems breathing and increased need for oxygen and nebulizers. You had a test that showed you have a virus, which probably triggered your COPD exacerbation. You were treated with antibiotics and prednisone. Your breathing improved. Please continue your home inhaler, along with a 5 day course of prednisone. You will also be started on a new inhaler called stiolto. Please take as advised. You have been set up with an appointment on Friday September 28th at 9:30AM with Dr. Parisi. A CD has been provided to you with the images from the CT scans that you had preformed. Please also follow up with your pulmonologist upon discharge from the hospital. Please continue your metformin. Your HA1c was measured at 6.1. Please follow up with your primary doctor, Dr. Parisi on Friday September 28th at 9:30AM. You came in with problems breathing and increased need for oxygen and nebulizers. You had a test that showed you have a virus, which probably triggered your COPD exacerbation. You were treated with antibiotics and prednisone. Your breathing improved. Please continue your home inhaler, a new inhaler-stiolto along with prednisone for 4 additional days. Please follow up with Dr. Parisi on Friday September 28th at 9:30AM. You have a history of lung cancer, and CT of your chest showed you still have lung cancer. Please follow up with your primary care doctor for follow up. A CD has been provided with the images from the CT scan of your chest. Please bring this to your appointment with Dr. Parisi on Friday September 28th at 9:30AM.

## 2018-09-24 NOTE — DISCHARGE NOTE ADULT - CARE PLAN
Principal Discharge DX:	COPD exacerbation  Secondary Diagnosis:	Diabetes  Secondary Diagnosis:	Rhinovirus  Secondary Diagnosis:	Lung cancer  Secondary Diagnosis:	Heroin abuse  Secondary Diagnosis:	Chronic venous insufficiency Principal Discharge DX:	COPD exacerbation  Goal:	To help your breathing  Assessment and plan of treatment:	You came in with problems breathing and increased need for oxygen and nebulizers. You had a test that showed you have a virus, which probably triggered your COPD exacerbation. You were treated with antibiotics and prednisone. Your breathing improved. Please continue your home inhaler, along with prednisone and the antibiotic.  Secondary Diagnosis:	Diabetes  Assessment and plan of treatment:	Please continue your metformin. Your HA1c was measured at 6.1.  Secondary Diagnosis:	Rhinovirus  Assessment and plan of treatment:	You came in with problems breathing and increased need for oxygen and nebulizers. You had a test that showed you have a virus, which probably triggered your COPD exacerbation. You were treated with antibiotics and prednisone. Your breathing improved. Please continue your home inhaler, along with prednisone and the antibiotic.  Secondary Diagnosis:	Lung cancer  Assessment and plan of treatment:	You have a history of lung cancer, and CT of your chest showed you still have lung cancer. Please follow up with your primary care doctor for follow up.  Secondary Diagnosis:	Heroin abuse  Assessment and plan of treatment:	You came in after you used heroine. You have used suboxone in the past. Please try to abstain from using heroine and follow up with your primary care physician for further treatment. Also please consider attending meetings at [facility].  Secondary Diagnosis:	Chronic venous insufficiency  Assessment and plan of treatment:	You have a history of venous stasis changes in your legs, for which you have bandages. Please continue to keep them as clean as possible. Principal Discharge DX:	COPD exacerbation  Goal:	To help your breathing  Assessment and plan of treatment:	You came in with problems breathing and increased need for oxygen and nebulizers. You had a test that showed you have a virus, which probably triggered your COPD exacerbation. You were treated with antibiotics and prednisone. Your breathing improved. Please continue your home inhaler, along with prednisone and the antibiotic. You will also be started on a new inhaler called stiolto. Please take as advised. If you do not have a pulmonologist, please follow up within 1 week with [facility] pulmonology.  Secondary Diagnosis:	Diabetes  Assessment and plan of treatment:	Please continue your metformin. Your HA1c was measured at 6.1.  Secondary Diagnosis:	Rhinovirus  Assessment and plan of treatment:	You came in with problems breathing and increased need for oxygen and nebulizers. You had a test that showed you have a virus, which probably triggered your COPD exacerbation. You were treated with antibiotics and prednisone. Your breathing improved. Please continue your home inhaler, along with prednisone and the antibiotic.  Secondary Diagnosis:	Lung cancer  Assessment and plan of treatment:	You have a history of lung cancer, and CT of your chest showed you still have lung cancer. Please follow up with your primary care doctor for follow up.  Secondary Diagnosis:	Heroin abuse  Assessment and plan of treatment:	You came in after you used heroine. You have used suboxone in the past. Please try to abstain from using heroine and follow up with your primary care physician for further treatment. Also please consider attending meetings at [facility].  Secondary Diagnosis:	Chronic venous insufficiency  Assessment and plan of treatment:	You have a history of venous stasis changes in your legs, for which you have bandages. Please continue to keep them as clean as possible. Principal Discharge DX:	COPD exacerbation  Goal:	Improve shortness of breath and cough.  Assessment and plan of treatment:	You came in with problems breathing and increased need for oxygen and nebulizers. You had a test that showed you have a virus, which probably triggered your COPD exacerbation. You were treated with antibiotics and prednisone. Your breathing improved. Please continue your home inhaler, along with a 5 day course of prednisone. You will also be started on a new inhaler called stiolto. Please take as advised. If you do not have a pulmonologist, please follow up within 1 week with [facility] pulmonology.  Secondary Diagnosis:	Diabetes  Assessment and plan of treatment:	Please continue your metformin. Your HA1c was measured at 6.1. Please follow up with your primary doctor, Dr. Parisi.  Secondary Diagnosis:	Rhinovirus  Assessment and plan of treatment:	You came in with problems breathing and increased need for oxygen and nebulizers. You had a test that showed you have a virus, which probably triggered your COPD exacerbation. You were treated with antibiotics and prednisone. Your breathing improved. Please continue your home inhaler, along with prednisone and the antibiotic.  Secondary Diagnosis:	Lung cancer  Assessment and plan of treatment:	You have a history of lung cancer, and CT of your chest showed you still have lung cancer. Please follow up with your primary care doctor for follow up.  Secondary Diagnosis:	Heroin abuse  Assessment and plan of treatment:	You came in after you used heroine. You have used suboxone in the past. Please try to abstain from using heroine and follow up with your primary care physician for further treatment. Also please consider attending meetings at [facility].  Secondary Diagnosis:	Chronic venous insufficiency  Assessment and plan of treatment:	You have a history of venous stasis changes in your legs, for which you have bandages. Please continue to keep them as clean as possible. Principal Discharge DX:	COPD exacerbation  Goal:	Improve shortness of breath and cough.  Assessment and plan of treatment:	You came in with problems breathing and increased need for oxygen and nebulizers. You had a test that showed you have a virus, which probably triggered your COPD exacerbation. You were treated with antibiotics and prednisone. Your breathing improved. Please continue your home inhaler, along with a 5 day course of prednisone. You will also be started on a new inhaler called stiolto. Please take as advised. You have been set up with an appointment on Friday September 28th at 9:30AM with Dr. Parisi. A CD has been provided to you with the images from the CT scans that you had preformed. Please also follow up with your pulmonologist upon discharge from the hospital.  Secondary Diagnosis:	Diabetes  Assessment and plan of treatment:	Please continue your metformin. Your HA1c was measured at 6.1. Please follow up with your primary doctor, Dr. Parisi on Friday September 28th at 9:30AM.  Secondary Diagnosis:	Rhinovirus  Assessment and plan of treatment:	You came in with problems breathing and increased need for oxygen and nebulizers. You had a test that showed you have a virus, which probably triggered your COPD exacerbation. You were treated with antibiotics and prednisone. Your breathing improved. Please continue your home inhaler, a new inhaler-stiolto along with prednisone for 4 additional days. Please follow up with Dr. Parisi on Friday September 28th at 9:30AM.  Secondary Diagnosis:	Lung cancer  Assessment and plan of treatment:	You have a history of lung cancer, and CT of your chest showed you still have lung cancer. Please follow up with your primary care doctor for follow up. A CD has been provided with the images from the CT scan of your chest. Please bring this to your appointment with Dr. Parisi on Friday September 28th at 9:30AM.  Secondary Diagnosis:	Heroin abuse  Assessment and plan of treatment:	You came in after you used heroine. You have used suboxone in the past. Please try to abstain from using heroine and follow up with your primary care physician for further treatment. Also please consider attending meetings at [facility].  Secondary Diagnosis:	Chronic venous insufficiency  Assessment and plan of treatment:	You have a history of venous stasis changes in your legs, for which you have bandages. Please continue to keep them as clean as possible.

## 2018-09-24 NOTE — DISCHARGE NOTE ADULT - HOSPITAL COURSE
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 Patient awake and alert by time of this examiners examination. However upon ED evaluation patient noted to be lethargic and falling asleep during evaluation.  Patient reports for the past 2-3 weeks a new cough w/o fever or chills. Denies any SOB worsening from his baseline, able to walk 2 blocks before becoming SOB. IS SOB at baseline uses 3L of oxygen at home. [facility] a [facility] with a home attendant. 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 Patient awake and alert by time of this examiners examination. However upon ED evaluation patient noted to be lethargic and falling asleep during evaluation.  Patient reports for the past 2-3 weeks a new cough w/o fever or chills. Denies any SOB worsening from his baseline, able to walk 2 blocks before becoming SOB. IS SOB at baseline uses 3L of oxygen at home. In the ED he was treated with azithromycin, duonebs, and prednisone. The COPD bundle team was called and evaluated the patient. He will be continued on prednisone for 5 days and azithromycin for 5 days. He is stable to be discharged. 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 Patient awake and alert by time of this examiners examination. However upon ED evaluation patient noted to be lethargic and falling asleep during evaluation.  Patient reports for the past 2-3 weeks a new cough w/o fever or chills. Denies any SOB worsening from his baseline, able to walk 2 blocks before becoming SOB. IS SOB at baseline uses 3L of oxygen at home. In the ED he was treated with azithromycin, duonebs, and prednisone. The COPD bundle team/Pulmonologist were consulted and evaluated the patient. He will be continued on prednisone for 5 days, albuterol and stiolto. Patient determined stable for discharge.

## 2018-09-24 NOTE — DISCHARGE NOTE ADULT - PATIENT PORTAL LINK FT
You can access the [facility] Patient Portal, offered by [facility], by registering with the following website: [email or web address]

## 2018-09-24 NOTE — DISCHARGE NOTE ADULT - ADDITIONAL INSTRUCTIONS
Please follow up with your primary doctor, Dr. Parisi Please follow up with your primary doctor, Dr. Parisi within 1-2 weeks of discharge. Please follow up with your primary doctor, Dr. Parisi. An appointment has been made for you this Friday September 28th at 9:30AM. Please bring the CD that was provided to you with the images of the CT scans performed while admitted to the hospital.

## 2018-09-24 NOTE — CONSULT NOTE ADULT - ASSESSMENT
Patient is an 85 yo M w/ PMHx of DM, Lung Ca s/p resection in 2002, current heroin user, former alcohol abuse (quit 40 yrs ago), and presumed COPD on home O2 3L who presents with AMS/lethargy in the setting of heroin use, found to be positive for rhinovirus and with likely malignant nodules in his lungs.

## 2018-09-24 NOTE — DISCHARGE NOTE ADULT - INSTRUCTIONS
Servando continue to consume a healthy diet. Servando continue to consume a low carbohydrate diet as tolerated.

## 2018-09-24 NOTE — PROGRESS NOTE ADULT - SUBJECTIVE AND OBJECTIVE BOX
Patient is a 84y old  Male who presents with a chief complaint of Cough and Hypoxia (24 Sep 2018 16:48)      INTERVAL HPI/OVERNIGHT EVENTS:    Pt. seen and examined earlier today  Pt. feels much better, c/o less cough/sputum; WATERMAN at baseline  No F/C, CP, N/V/D, rhinorrhea, myalgia    Review of Systems: 12 point review of systems otherwise negative    MEDICATIONS  (STANDING):  azithromycin   Tablet 250 milliGRAM(s) Oral daily  dextrose 5%. 1000 milliLiter(s) (50 mL/Hr) IV Continuous <Continuous>  dextrose 50% Injectable 12.5 Gram(s) IV Push once  dextrose 50% Injectable 25 Gram(s) IV Push once  dextrose 50% Injectable 25 Gram(s) IV Push once  enoxaparin Injectable 40 milliGRAM(s) SubCutaneous every 24 hours  insulin lispro (HumaLOG) corrective regimen sliding scale   SubCutaneous Before meals and at bedtime  predniSONE   Tablet 40 milliGRAM(s) Oral every 24 hours    MEDICATIONS  (PRN):  ALBUTerol/ipratropium for Nebulization 3 milliLiter(s) Nebulizer every 6 hours PRN Shortness of Breath and/or Wheezing  dextrose 40% Gel 15 Gram(s) Oral once PRN Blood Glucose LESS THAN 70 milliGRAM(s)/deciliter  glucagon  Injectable 1 milliGRAM(s) IntraMuscular once PRN Glucose LESS THAN 70 milligrams/deciliter      Allergies    No Known Allergies    Intolerances          Vital Signs Last 24 Hrs  T(C): 36.7 (24 Sep 2018 16:16), Max: 37.2 (23 Sep 2018 23:27)  T(F): 98 (24 Sep 2018 16:16), Max: 98.9 (23 Sep 2018 23:27)  HR: 95 (24 Sep 2018 16:16) (94 - 109)  BP: 111/63 (24 Sep 2018 16:16) (105/67 - 128/75)  BP(mean): --  RR: 18 (24 Sep 2018 16:16) (18 - 20)  SpO2: 100% (24 Sep 2018 16:16) (86% - 100%)  CAPILLARY BLOOD GLUCOSE      POCT Blood Glucose.: 191 mg/dL (24 Sep 2018 16:39)  POCT Blood Glucose.: 155 mg/dL (24 Sep 2018 12:11)  POCT Blood Glucose.: 134 mg/dL (24 Sep 2018 08:07)        Physical Exam:  (earlier today)  Daily     Daily   General:  comfortable-appearing in NAD  HEENT:  MMM  CV:  RRR, no JVD  Lungs:  B/L rhonchi; normal WOB on NC  Extremities:  chronic venous stasis skin changes B/L LE  Skin:  WWP  Neuro:  AAOx3    LABS:                        12.4   9.9   )-----------( 255      ( 24 Sep 2018 05:55 )             39.8     09-24    139  |  99  |  13  ----------------------------<  126<H>  4.0   |  29  |  1.01    Ca    8.7      24 Sep 2018 05:55  Phos  3.2     09-24  Mg     2.1     09-24    TPro  7.8  /  Alb  4.0  /  TBili  0.2  /  DBili  x   /  AST  10  /  ALT  <5<L>  /  AlkPhos  64  09-23            RADIOLOGY & ADDITIONAL TESTS:    ---------------------------------------------------------------------------  I personally reviewed: [  ]EKG   [  ]CXR    [  ] CT    [  ]Other  ---------------------------------------------------------------------------  PLEASE CHECK WHEN PRESENT:     [  ]Heart Failure     [  ] Acute     [  ] Acute on Chronic     [  ] Chronic  -------------------------------------------------------------------     [  ]Diastolic [HFpEF]     [  ]Systolic [HFrEF]     [  ]Combined [HFpEF & HFrEF]     [  ]Other:  -------------------------------------------------------------------  [  ]GRANT     [  ]ATN     [  ]Reneal Medullary Necrosis     [  ]Renal Cortical Necrosis     [  ]Other Pathological Lesions:    [  ]CKD 1  [  ]CKD 2  [  ]CKD 3  [  ]CKD 4  [  ]CKD 5  [  ]Other  -------------------------------------------------------------------  [  ]Other/Unspecified:    --------------------------------------------------------------------    Abdominal Nutritional Status  [  ]Malnutrition: See Nutrition Note  [  ]Cachexia  [  ]Other:   [  ]Supplement Ordered:  [  ]Morbid Obesity (BMI >=40]

## 2018-09-24 NOTE — DISCHARGE NOTE ADULT - NS AS DC STROKE DX YN
Here 4 days ago with abd pain. Here today with c/o abd pain, sweating, HA, vomiting, has not eaten  For 1.5 days. Vomited x 3 today.   
no

## 2018-09-24 NOTE — CONSULT NOTE ADULT - SUBJECTIVE AND OBJECTIVE BOX
PULMONARY CONSULT NOTE    CHIEF COMPLAINT: Patient is a 84y old Male who presents with a chief complaint of AMS     HPI:  Patient is an 85 yo M w/ PMHx of DM, Lung Ca s/p resection in 2002, current heroin user, former alcohol abuse (quit 40 yrs ago), and presumed COPD on home O2 3L who presents with AMS/lethargy in the setting of heroin use. The patient reports he used heroin yesterday and suddenly felt very weak and dizzy. His friends then got concerned and called EMS who reported to have found the patient altered. Upon presentation to [facility] his mental status improved. He states he uses heroin occasionally by smoking it. He also reports that for the past 2-3 weeks he has a new cough productive of phlegm w/o fever or chills. Denies any SOB or changes in his ET and reports being able to walk 2 blocks before becoming SOB. He uses 3L of oxygen at home. States [facility] a [facility] with a home attendant 3 days a week. He is unable to report when he started using oxygen or when his last hospitalization for his COPD occurred. Otherwise denies fever, chills, lightheadedness, CP, palpitations, N/V/D/C, abdominal pain, dysuria, hematuria.       In ED, vitals were T 98.8, , /71, R 22, S 92%. The patient was given ceftriaxone, azithromycin, Duonebs x3 and 40mg prednisone. Patient had CT chest indicative of multiple nodules, likely malignant with emphysematous changes. RVP positive for enterovirus. Patient was admitted to medicine.    PAST MEDICAL & SURGICAL HISTORY:  Diabetes  Chronic venous insufficiency  S/P lobectomy of lung    REVIEW OF SYSTEMS: negative except as stated in HPI.    MEDICATIONS  (STANDING):  azithromycin   Tablet 250 milliGRAM(s) Oral daily  dextrose 5%. 1000 milliLiter(s) (50 mL/Hr) IV Continuous <Continuous>  dextrose 50% Injectable 12.5 Gram(s) IV Push once  dextrose 50% Injectable 25 Gram(s) IV Push once  dextrose 50% Injectable 25 Gram(s) IV Push once  enoxaparin Injectable 40 milliGRAM(s) SubCutaneous every 24 hours  insulin lispro (HumaLOG) corrective regimen sliding scale   SubCutaneous Before meals and at bedtime  predniSONE   Tablet 40 milliGRAM(s) Oral every 24 hours    MEDICATIONS  (PRN):  ALBUTerol/ipratropium for Nebulization 3 milliLiter(s) Nebulizer every 6 hours PRN Shortness of Breath and/or Wheezing  dextrose 40% Gel 15 Gram(s) Oral once PRN Blood Glucose LESS THAN 70 milliGRAM(s)/deciliter  glucagon  Injectable 1 milliGRAM(s) IntraMuscular once PRN Glucose LESS THAN 70 milligrams/deciliter    Allergies    No Known Allergies    Intolerances    FAMILY HISTORY:  No pertinent family history in first degree relatives: PATIENT REFUSING TO DISCUSSS      SOCIAL HISTORY:   Patient reports 50+ pk/yr smoking history, currently smokes only occasionally. He reports former alcohol abuse but quit 40 years ago. Occasional heroin use.     Vital Signs Last 24 Hrs  T(C): 36.7 (24 Sep 2018 16:16), Max: 37.2 (23 Sep 2018 23:27)  T(F): 98 (24 Sep 2018 16:16), Max: 98.9 (23 Sep 2018 23:27)  HR: 95 (24 Sep 2018 16:16) (94 - 109)  BP: 111/63 (24 Sep 2018 16:16) (105/67 - 128/75)  BP(mean): --  RR: 18 (24 Sep 2018 16:16) (18 - 22)  SpO2: 100% (24 Sep 2018 16:16) (86% - 100%)    PHYSICAL EXAM:  GEN: alert, lying comfortably in bed  HEENT: PERRL, no relative afferent pupillary defect, EOMI, moist MM, no pharyngeal erythema or exudate  CV: RRR, S1/S2, no murmurs appreciated, no JVD  RESP: Prolonged expiratory phase b/l with diffuse low pitched wheezes  ABD: soft, BS+, nontender, nondistended, no guarding/rebound  EXTREMITIES: WWP, 2+ pitting edema of LE b/l w/ chronic venous stasis changes  SKIN: warm, dry, intact, no rashes  NEURO: A&Ox3, no focal deficits    LABS: reviewed.    CAPILLARY BLOOD GLUCOSE      POCT Blood Glucose.: 155 mg/dL (24 Sep 2018 12:11)  POCT Blood Glucose.: 134 mg/dL (24 Sep 2018 08:07)  POCT Blood Glucose.: 102 mg/dL (23 Sep 2018 19:11)                          12.4   9.9   )-----------( 255      ( 24 Sep 2018 05:55 )             39.8     09-24    139  |  99  |  13  ----------------------------<  126<H>  4.0   |  29  |  1.01    Ca    8.7      24 Sep 2018 05:55  Phos  3.2     09-24  Mg     2.1     09-24    TPro  7.8  /  Alb  4.0  /  TBili  0.2  /  DBili  x   /  AST  10  /  ALT  <5<L>  /  AlkPhos  64  09-23      LIVER FUNCTIONS - ( 23 Sep 2018 19:48 )  Alb: 4.0 g/dL / Pro: 7.8 g/dL / ALK PHOS: 64 U/L / ALT: <5 U/L / AST: 10 U/L / GGT: x           Culture - Blood (collected 24 Sep 2018 02:14)  Source: .Blood Blood-Peripheral  Preliminary Report (24 Sep 2018 15:02):    No growth at 12 hours    Culture - Blood (collected 24 Sep 2018 02:14)  Source: .Blood Blood-Peripheral  Preliminary Report (24 Sep 2018 15:02):    No growth at 12 hours        RADIOLOGY AND ADDITIONAL TESTS: reviewed.  < from: CT Angio Chest PE Protocol w/ IV Cont (09.24.18 @ 10:33) >  FINDINGS:  Suboptimal opacification of the pulmonary arteries limits assessment for   distal pulmonary emboli. Within that limitation, no main, lobar or   proximal segmental pulmonary emboli are seen. There is unchanged   dilatation of the main pulmonary trunk measuring up to 3.3 cm in   diameter. There is no change in mild ectasia of the thoracic aorta   measuring up to 4.0 cm at the sinuses of Valsalva and 3.2 cm at the   proximal descending aorta. Mediastinal surgical clips are noted. There   are increased number of nonenlarged mediastinal and right hilar lymph   nodes the largest involving the right hilum measuring 1 cm in short axis.    Patient is status post right upper lobectomy and right lower lobe wedge   resection. There is severe predominantly centrilobular emphysema with   scattered areas of paraseptal emphysema, most advanced in the upper lung   zones. There are several thin-walled air cysts at the right lung base,   the largest measuring 3.7 cm in diameter. Redemonstrated are multiple   spiculated parenchymal lesions as follows: A 2.0 cm (average of AP and   transverse dimensions) spiculated mass in the anterior segment of the   left upper lobe (series 24, image 25), a 2.4 cm spiculated mass in the   apicoposterior segment of the left upper lobe (series 24, image 17), and   a 1.9 cm subpleural nodule which may be pleural-based abutting the medial   aspect of the medial basal segment of the right lower lobe (series 24,   image 60). Additionally, there is a homogeneous oval lesion abutting the   right apical pleura and predominantly centered posterior to the right   subclavian artery. It projects within the mediastinal fat planes and   measures 2.3 x 2.0 x 1.7 cm (sagittal x AP x transverse) and is unchanged   consistent with an enlarged lymph node. Bibasilar dependent and linear  atelectasis is again present.    Limited evaluation of the upper abdomen demonstrates a simple left upper   pole renal cortical cyst measuring 1.6 cm.     Evaluation of the osseous structures demonstrates moderate degenerative   changes.      IMPRESSION:  1.  Suboptimal pulmonary arterial contrast bolus. Within that limitation,   no evidence of main, lobar or proximal segmental branch pulmonary artery   embolism is identified.  2.  Bilateral spiculated and probable pleural based masses detailed  above, unchanged, again suspicious for neoplasia. Whole-body PET/CT   correlation and histological sampling is suggested.   3.  Severe emphysema and multiple discrete parenchymal cysts in the right   lower lung zone unchanged. No pneumothorax.  4.Unchanged dilatation of the main pulmonary trunk measuring 3.3 cm in   diameter. Findings may suggest pulmonary arterial hypertension.

## 2018-09-24 NOTE — CONSULT NOTE ADULT - ATTENDING COMMENTS
84 yr old M followed at [facility] for lung cancer resected in past with multiple masses seen on CT suggesting malignancy.  Admitted because of lethargy after drug use, may have acute viral infection, mild COPD exacerbation.  On discharge will follow with his usual care givers for w/u pulm abnormalities.

## 2018-09-24 NOTE — CONSULT NOTE ADULT - PROBLEM SELECTOR RECOMMENDATION 9
Patient reports increased cough and sputum production for the past 2-3 weeks with no changes in ET. On 3L O2 at home. RVP positive for rhinovirus. No s/sx of pneumonia. Likely an acute exacerbation in setting of viral infection. CAT score of 15 and mMRC score of 2, qualifying for GOLD group B.   -smoking cessation counseling provided  -continue with prednisone 40mg daily for a total of 5 days  -DC Abx as symptoms are likely caused by viral infection  -c/w home albuterol  -Start patient on Stiolto for coverage with LAMA/LABA  -Make an appointment for patient to follow up with his pulmonologist following discharge

## 2018-09-27 DIAGNOSIS — Z28.21 IMMUNIZATION NOT CARRIED OUT BECAUSE OF PATIENT REFUSAL: ICD-10-CM

## 2018-09-27 DIAGNOSIS — Z99.81 DEPENDENCE ON SUPPLEMENTAL OXYGEN: ICD-10-CM

## 2018-09-27 DIAGNOSIS — F11.10 OPIOID ABUSE, UNCOMPLICATED: ICD-10-CM

## 2018-09-27 DIAGNOSIS — J44.1 CHRONIC OBSTRUCTIVE PULMONARY DISEASE WITH (ACUTE) EXACERBATION: ICD-10-CM

## 2018-09-27 DIAGNOSIS — Z79.84 LONG TERM (CURRENT) USE OF ORAL HYPOGLYCEMIC DRUGS: ICD-10-CM

## 2018-09-27 DIAGNOSIS — B97.89 OTHER VIRAL AGENTS AS THE CAUSE OF DISEASES CLASSIFIED ELSEWHERE: ICD-10-CM

## 2018-09-27 DIAGNOSIS — R09.02 HYPOXEMIA: ICD-10-CM

## 2018-09-27 DIAGNOSIS — R41.82 ALTERED MENTAL STATUS, UNSPECIFIED: ICD-10-CM

## 2018-09-27 DIAGNOSIS — F17.210 NICOTINE DEPENDENCE, CIGARETTES, UNCOMPLICATED: ICD-10-CM

## 2018-09-27 DIAGNOSIS — J40 BRONCHITIS, NOT SPECIFIED AS ACUTE OR CHRONIC: ICD-10-CM

## 2018-09-27 DIAGNOSIS — E11.9 TYPE 2 DIABETES MELLITUS WITHOUT COMPLICATIONS: ICD-10-CM

## 2018-09-27 DIAGNOSIS — C34.90 MALIGNANT NEOPLASM OF UNSPECIFIED PART OF UNSPECIFIED BRONCHUS OR LUNG: ICD-10-CM

## 2018-09-27 DIAGNOSIS — I87.2 VENOUS INSUFFICIENCY (CHRONIC) (PERIPHERAL): ICD-10-CM

## 2018-09-28 RX ORDER — ALBUTEROL 90 UG/1
1 AEROSOL, METERED ORAL
Qty: 1 | Refills: 0 | OUTPATIENT
Start: 2018-09-28

## 2018-09-28 RX ORDER — TIOTROPIUM BROMIDE AND OLODATEROL 3.124; 2.736 UG/1; UG/1
2 SPRAY, METERED RESPIRATORY (INHALATION)
Qty: 1 | Refills: 0 | OUTPATIENT
Start: 2018-09-28

## 2018-09-28 RX ORDER — METFORMIN HYDROCHLORIDE 850 MG/1
0 TABLET ORAL
Qty: 0 | Refills: 0 | COMMUNITY

## 2018-09-28 RX ORDER — METFORMIN HYDROCHLORIDE 850 MG/1
1 TABLET ORAL
Qty: 30 | Refills: 0 | OUTPATIENT
Start: 2018-09-28 | End: 2018-10-27

## 2018-09-28 RX ORDER — ALBUTEROL 90 UG/1
0 AEROSOL, METERED ORAL
Qty: 0 | Refills: 0 | COMMUNITY

## 2018-09-29 LAB
CULTURE RESULTS: SIGNIFICANT CHANGE UP
CULTURE RESULTS: SIGNIFICANT CHANGE UP
SPECIMEN SOURCE: SIGNIFICANT CHANGE UP
SPECIMEN SOURCE: SIGNIFICANT CHANGE UP

## 2018-12-21 NOTE — PATIENT PROFILE ADULT. - COMPLETE THE FOLLOWING IF THE PATIENT REFUSES THE INFLUENZA VACCINE:
Patient Information     Patient Name MRN Mita Helton [id number] Female 1955      Telephone Encounter by Karina Greer RN at 2017  8:27 AM     Author:  Karina Greer RN Service:  (none) Author Type:  NURS- Registered Nurse     Filed:  2017  8:36 AM Encounter Date:  2017 Status:  Signed     :  Karina Greer RN (NURS- Registered Nurse)            This is a Refill request from: [facility]  Name of Medication: LORazepam (ATIVAN) 0.5 mg tab  Quantity requested: 90 x 5   Last fill date: 17  Due for refill: 17  Last visit with RENEE MURRIETA was on: 2017   Controlled Substance Agreement: none  Diagnosis r/t this medication request: Other mixed anxiety disorders     Unable to complete prescription refill per RN Medication Refill Policy.................... Karina Greer RN ....................  2017   8:28 AM           small sips/bites/oral hygiene allow for swallow between intakes/oral hygiene/small sips/bites small sips/bites/oral hygiene Risks/benefits discussed with patient/surrogate

## 2018-12-26 PROCEDURE — 87486 CHLMYD PNEUM DNA AMP PROBE: CPT

## 2018-12-26 PROCEDURE — 36415 COLL VENOUS BLD VENIPUNCTURE: CPT

## 2018-12-26 PROCEDURE — 96375 TX/PRO/DX INJ NEW DRUG ADDON: CPT

## 2018-12-26 PROCEDURE — 87581 M.PNEUMON DNA AMP PROBE: CPT

## 2018-12-26 PROCEDURE — 84100 ASSAY OF PHOSPHORUS: CPT

## 2018-12-26 PROCEDURE — 87798 DETECT AGENT NOS DNA AMP: CPT

## 2018-12-26 PROCEDURE — 70450 CT HEAD/BRAIN W/O DYE: CPT

## 2018-12-26 PROCEDURE — 99285 EMERGENCY DEPT VISIT HI MDM: CPT | Mod: 25

## 2018-12-26 PROCEDURE — 71250 CT THORAX DX C-: CPT

## 2018-12-26 PROCEDURE — 83880 ASSAY OF NATRIURETIC PEPTIDE: CPT

## 2018-12-26 PROCEDURE — 96361 HYDRATE IV INFUSION ADD-ON: CPT

## 2018-12-26 PROCEDURE — 80048 BASIC METABOLIC PNL TOTAL CA: CPT

## 2018-12-26 PROCEDURE — 71275 CT ANGIOGRAPHY CHEST: CPT

## 2018-12-26 PROCEDURE — 87633 RESP VIRUS 12-25 TARGETS: CPT

## 2018-12-26 PROCEDURE — G0378: CPT

## 2018-12-26 PROCEDURE — 82962 GLUCOSE BLOOD TEST: CPT

## 2018-12-26 PROCEDURE — 85025 COMPLETE CBC W/AUTO DIFF WBC: CPT

## 2018-12-26 PROCEDURE — 96365 THER/PROPH/DIAG IV INF INIT: CPT

## 2018-12-26 PROCEDURE — 80053 COMPREHEN METABOLIC PANEL: CPT

## 2018-12-26 PROCEDURE — 83036 HEMOGLOBIN GLYCOSYLATED A1C: CPT

## 2018-12-26 PROCEDURE — 94640 AIRWAY INHALATION TREATMENT: CPT

## 2018-12-26 PROCEDURE — 83735 ASSAY OF MAGNESIUM: CPT

## 2018-12-26 PROCEDURE — 87040 BLOOD CULTURE FOR BACTERIA: CPT

## 2018-12-26 PROCEDURE — 71045 X-RAY EXAM CHEST 1 VIEW: CPT

## 2020-07-29 NOTE — ED ADULT NURSE NOTE - CARDIO ASSESSMENT
WDL

## 2021-03-16 NOTE — PATIENT PROFILE ADULT. - AS SC BRADEN SENSORY
Will call [facility] specialty pharmacy in morning to check cost of mirvaso. They are often able to find coupons that can apply to scripts to lower cost. [facility] will ship to patient's house. Will contact patient tomorrow after price is determined.    (3) slightly limited

## 2023-03-01 NOTE — ED ADULT NURSE NOTE - NS ED NURSE LEVEL OF CONSCIOUSNESS MENTAL STATUS
Patient comes to clinic for follow up anticoagulation visit.  Last INR on 2/20/23 was 1.3.  Dose increased for one dose and then resumed on 35mg TWD.   Today's INR is 2.4 and is within goal range.    Current warfarin total weekly dose of 35 mg verified.  Informed the INR result is within therapeutic range and instructed to maintain current dose per protocol. Discussed dose and return date of 5/10/23 for next INR. See Anticoagulation flowsheet.    Dr. Hanson is in the office today supervising the treatment. Note forwarded for review.    Instructed to contact the clinic with any unusual bleeding or bruising, any changes in medications, diet, health status, lifestyle, or any other changes, questions or concerns. Verbalized understanding of all discussed.     
Awake/Alert

## 2023-07-06 NOTE — PATIENT PROFILE ADULT. - HOW PATIENT ADDRESSED, PROFILE
----- Message from Cameron Parisi MD sent at 7/6/2023 10:19 AM CDT -----  Regarding: FW: appt  Contact: @ [phone]  Could you reach out to patient and schedule him for an appointment? He can be added on to next week  ----- Message -----  From: Rajani Nolan  Sent: 7/6/2023  10:15 AM CDT  To: [facility] Staff  Subject: appt                                             Pt requesting a appointment for the following a appointment to discuss upcoming possible procedure per the doctor the staff was suppose to reach out  ...Please call and adv @ [phone]        
Called patient they will come in to see Dr. FAIRBANKS tomorrow at 9:30 am  
Lokesh